# Patient Record
Sex: FEMALE | Race: WHITE | NOT HISPANIC OR LATINO | Employment: FULL TIME | URBAN - NONMETROPOLITAN AREA
[De-identification: names, ages, dates, MRNs, and addresses within clinical notes are randomized per-mention and may not be internally consistent; named-entity substitution may affect disease eponyms.]

---

## 2023-12-27 RX ORDER — LEVETIRACETAM 750 MG/1
TABLET ORAL
COMMUNITY

## 2023-12-27 RX ORDER — SULFASALAZINE 500 MG/1
500 TABLET, DELAYED RELEASE ORAL 2 TIMES DAILY
COMMUNITY

## 2023-12-27 RX ORDER — BISMUTH SUBSALICYLATE 262 MG
1 TABLET,CHEWABLE ORAL DAILY
COMMUNITY

## 2023-12-27 RX ORDER — FOLIC ACID 1 MG/1
1 TABLET ORAL DAILY
COMMUNITY

## 2023-12-27 RX ORDER — BIOTIN 10 MG
TABLET ORAL
COMMUNITY

## 2023-12-27 RX ORDER — VERAPAMIL HYDROCHLORIDE 180 MG/1
180 CAPSULE, EXTENDED RELEASE ORAL NIGHTLY
COMMUNITY
End: 2024-01-10 | Stop reason: SDUPTHER

## 2023-12-27 RX ORDER — ERGOCALCIFEROL 1.25 MG/1
1.25 CAPSULE ORAL
COMMUNITY

## 2023-12-27 RX ORDER — FLECAINIDE ACETATE 50 MG/1
50 TABLET ORAL 2 TIMES DAILY
COMMUNITY
End: 2024-01-17 | Stop reason: SDUPTHER

## 2023-12-27 RX ORDER — PAROXETINE HYDROCHLORIDE 40 MG/1
40 TABLET, FILM COATED ORAL EVERY MORNING
COMMUNITY

## 2023-12-27 RX ORDER — ARIPIPRAZOLE 2 MG/1
2 TABLET ORAL DAILY
COMMUNITY

## 2024-01-10 ENCOUNTER — OFFICE VISIT (OUTPATIENT)
Dept: CARDIOLOGY | Facility: CLINIC | Age: 64
End: 2024-01-10
Payer: COMMERCIAL

## 2024-01-10 VITALS
HEIGHT: 65 IN | WEIGHT: 188 LBS | HEART RATE: 64 BPM | DIASTOLIC BLOOD PRESSURE: 56 MMHG | SYSTOLIC BLOOD PRESSURE: 96 MMHG | BODY MASS INDEX: 31.32 KG/M2

## 2024-01-10 DIAGNOSIS — E66.9 OBESITY (BMI 30.0-34.9): ICD-10-CM

## 2024-01-10 DIAGNOSIS — I10 ESSENTIAL HYPERTENSION: ICD-10-CM

## 2024-01-10 DIAGNOSIS — Z79.899 HIGH RISK MEDICATION USE: ICD-10-CM

## 2024-01-10 DIAGNOSIS — Z01.810 PREOP CARDIOVASCULAR EXAM: ICD-10-CM

## 2024-01-10 DIAGNOSIS — G47.33 OBSTRUCTIVE SLEEP APNEA SYNDROME: ICD-10-CM

## 2024-01-10 DIAGNOSIS — I48.0 PAROXYSMAL ATRIAL FIBRILLATION (MULTI): ICD-10-CM

## 2024-01-10 PROBLEM — R00.0 SINUS TACHYCARDIA: Status: ACTIVE | Noted: 2024-01-10

## 2024-01-10 PROBLEM — E66.811 OBESITY (BMI 30.0-34.9): Status: ACTIVE | Noted: 2024-01-10

## 2024-01-10 PROCEDURE — 1036F TOBACCO NON-USER: CPT | Performed by: INTERNAL MEDICINE

## 2024-01-10 PROCEDURE — 3074F SYST BP LT 130 MM HG: CPT | Performed by: INTERNAL MEDICINE

## 2024-01-10 PROCEDURE — 93000 ELECTROCARDIOGRAM COMPLETE: CPT | Performed by: INTERNAL MEDICINE

## 2024-01-10 PROCEDURE — 99214 OFFICE O/P EST MOD 30 MIN: CPT | Performed by: INTERNAL MEDICINE

## 2024-01-10 PROCEDURE — 1159F MED LIST DOCD IN RCRD: CPT | Performed by: INTERNAL MEDICINE

## 2024-01-10 PROCEDURE — 3078F DIAST BP <80 MM HG: CPT | Performed by: INTERNAL MEDICINE

## 2024-01-10 RX ORDER — VERAPAMIL HYDROCHLORIDE 180 MG/1
180 CAPSULE, EXTENDED RELEASE ORAL NIGHTLY
Qty: 90 CAPSULE | Refills: 3 | Status: SHIPPED | OUTPATIENT
Start: 2024-01-10

## 2024-01-10 NOTE — PATIENT INSTRUCTIONS
Please bring all medicines, vitamins, and herbal supplements with you when you come to the office.    Prescriptions will not be filled unless you are compliant with your follow up appointments or have a follow up appointment scheduled as per instruction of your physician. Refills should be requested at the time of your visit.     Cleared for total knee surgery, Hold eliquis 2 days prior

## 2024-01-10 NOTE — LETTER
January 10, 2024     Adrianna Patel MD  [address] [facility] [address]    Patient: Lucina Gordon   YOB: 1960   Date of Visit: 1/10/2024       Dear Dr. Adrianna Patel MD:    Thank you for referring Lucina Gordon to me for evaluation. Below are my notes for this consultation.  If you have questions, please do not hesitate to call me. I look forward to following your patient along with you.       Sincerely,     Lis Zaman MD      CC: Duke Castellano, DO  ______________________________________________________________________________________    Subjective   Lucina Gordon is a 63 y.o. female       Chief Complaint    Pre-op Clearance          HPI   Patient is in the office for follow-up for the problems noted below.  Since her last visit she lost significant weight on purpose and was diagnosed with sleep apnea and has been utilizing CPAP machine with excellent results.  She remains in normal sinus rhythm confirmed by EKG today with normal intervals.  She has no breakthrough events and no bleeding complications.  She is scheduled to have surgery on the left knee in the near future and was advised to proceed with surgery as planned and stop the Eliquis 2 to 3 days prior to surgery.  No cardiac testing will be needed in preparation for the surgery and the cardiac risks are low risk.    Assessment/recommendations:     1-paroxysmal atrial fibrillation, patient is currently on flecainide 50 mg 2 times daily along with the Eliquis. She had normal cardiac catheterization 2009 and normal echocardiogram December 2021. EKG reveals normal sinus rhythm and normal intervals  2- sleep apnea confirmed by sleep study recently currently on CPAP machine compliant with the device.  It has led to significant improvement in quality of life and weight loss.    3-high risk medication with antiarrhythmics anticoagulants. Well-tolerated  4-class I obesity, the patient was successful in losing  "significant amount of weight on purpose which is very healthy.  5-patient need cardiac clearance prior to left knee replacement surgery.  Her cardiac risks are low, no cardiac vesication will be needed and the patient was advised to hold Eliquis 2 to 3 days prior to surgery and resume after surgery when the risk of bleeding subsides.     Review of Systems   All other systems reviewed and are negative.         Visit Vitals  BP 96/56 (BP Location: Right arm, Patient Position: Sitting)   Pulse 64   Ht 1.651 m (5' 5\")   Wt 85.3 kg (188 lb)   BMI 31.28 kg/m²   Smoking Status Former   BSA 1.98 m²      EKG done in office today   Objective   Physical Exam  Constitutional:       Appearance: Normal appearance. She is normal weight.   HENT:      Nose: Nose normal.   Neck:      Vascular: No carotid bruit.   Cardiovascular:      Rate and Rhythm: Normal rate.      Pulses: Normal pulses.      Heart sounds: Normal heart sounds.   Pulmonary:      Effort: Pulmonary effort is normal.   Abdominal:      General: Bowel sounds are normal.      Palpations: Abdomen is soft.   Genitourinary:     Rectum: Normal.   Musculoskeletal:         General: Normal range of motion.      Cervical back: Normal range of motion.      Right lower leg: No edema.      Left lower leg: No edema.   Skin:     General: Skin is warm and dry.   Neurological:      General: No focal deficit present.      Mental Status: She is alert.   Psychiatric:         Mood and Affect: Mood normal.         Behavior: Behavior normal.         Thought Content: Thought content normal.         Judgment: Judgment normal.         Current Medications    Current Outpatient Medications:   •  apixaban (Eliquis) 5 mg tablet, Take 1 tablet (5 mg) by mouth 2 times a day., Disp: , Rfl:   •  ARIPiprazole (Abilify) 2 mg tablet, Take 1 tablet (2 mg) by mouth once daily., Disp: , Rfl:   •  biotin 10 mg tablet, Take by mouth., Disp: , Rfl:   •  ergocalciferol (Vitamin D2) 1.25 MG (60439 UT) capsule, " Take 1 capsule (1,250 mcg) by mouth 1 (one) time per week., Disp: , Rfl:   •  flecainide (Tambocor) 50 mg tablet, Take 1 tablet (50 mg) by mouth 2 times a day., Disp: , Rfl:   •  folic acid (Folvite) 1 mg tablet, Take 1 tablet (1 mg) by mouth once daily., Disp: , Rfl:   •  levETIRAcetam (Keppra) 750 mg tablet, Take by mouth., Disp: , Rfl:   •  multivitamin tablet, Take 1 tablet by mouth once daily., Disp: , Rfl:   •  OXYBUTYNIN CHLORIDE ORAL, Take 5 mg by mouth 2 times a day., Disp: , Rfl:   •  PARoxetine (Paxil) 40 mg tablet, Take 1 tablet (40 mg) by mouth once daily in the morning., Disp: , Rfl:   •  sulfaSALAzine (Azulfidine) 500 mg DR tablet, Take 1 tablet (500 mg) by mouth 2 times a day. Do not crush, chew, or split., Disp: , Rfl:   •  verapamil ER (Veralan PM) 180 mg 24 hr capsule, Take 1 capsule (180 mg) by mouth once daily at bedtime. Do not crush or chew., Disp: , Rfl:            Scribe Attestation  By signing my name below, I, Katelynn NORIEGA LPN  , Scribe   attest that this documentation has been prepared under the direction and in the presence of Lis Zaman MD.           Assessment/Plan   1. Preop cardiovascular exam        2. High risk medication use        3. Paroxysmal atrial fibrillation (CMS/HCC)        4. Obstructive sleep apnea syndrome        5. Obesity (BMI 30.0-34.9)        6. Essential hypertension

## 2024-01-17 DIAGNOSIS — I48.0 PAROXYSMAL ATRIAL FIBRILLATION (MULTI): ICD-10-CM

## 2024-01-17 RX ORDER — FLECAINIDE ACETATE 50 MG/1
50 TABLET ORAL 2 TIMES DAILY
Qty: 180 TABLET | Refills: 3 | Status: SHIPPED | OUTPATIENT
Start: 2024-01-17 | End: 2025-01-16

## 2024-07-23 ENCOUNTER — APPOINTMENT (OUTPATIENT)
Dept: CARDIOLOGY | Facility: CLINIC | Age: 64
End: 2024-07-23
Payer: COMMERCIAL

## 2024-07-23 VITALS
BODY MASS INDEX: 26.66 KG/M2 | DIASTOLIC BLOOD PRESSURE: 78 MMHG | HEIGHT: 65 IN | HEART RATE: 62 BPM | WEIGHT: 160 LBS | SYSTOLIC BLOOD PRESSURE: 114 MMHG

## 2024-07-23 DIAGNOSIS — I10 ESSENTIAL HYPERTENSION: ICD-10-CM

## 2024-07-23 DIAGNOSIS — Z87.891 FORMER SMOKER: ICD-10-CM

## 2024-07-23 DIAGNOSIS — I48.0 PAROXYSMAL ATRIAL FIBRILLATION (MULTI): Primary | ICD-10-CM

## 2024-07-23 DIAGNOSIS — Z79.899 HIGH RISK MEDICATION USE: ICD-10-CM

## 2024-07-23 PROBLEM — E66.9 OBESITY (BMI 30.0-34.9): Status: RESOLVED | Noted: 2024-01-10 | Resolved: 2024-07-23

## 2024-07-23 PROBLEM — E66.811 OBESITY (BMI 30.0-34.9): Status: RESOLVED | Noted: 2024-01-10 | Resolved: 2024-07-23

## 2024-07-23 PROCEDURE — 99214 OFFICE O/P EST MOD 30 MIN: CPT | Performed by: INTERNAL MEDICINE

## 2024-07-23 PROCEDURE — 1036F TOBACCO NON-USER: CPT | Performed by: INTERNAL MEDICINE

## 2024-07-23 PROCEDURE — 93000 ELECTROCARDIOGRAM COMPLETE: CPT | Performed by: INTERNAL MEDICINE

## 2024-07-23 PROCEDURE — 3078F DIAST BP <80 MM HG: CPT | Performed by: INTERNAL MEDICINE

## 2024-07-23 PROCEDURE — 1159F MED LIST DOCD IN RCRD: CPT | Performed by: INTERNAL MEDICINE

## 2024-07-23 PROCEDURE — 3074F SYST BP LT 130 MM HG: CPT | Performed by: INTERNAL MEDICINE

## 2024-07-23 PROCEDURE — 3008F BODY MASS INDEX DOCD: CPT | Performed by: INTERNAL MEDICINE

## 2024-07-23 RX ORDER — VERAPAMIL HYDROCHLORIDE 180 MG/1
180 CAPSULE, EXTENDED RELEASE ORAL NIGHTLY
Qty: 90 CAPSULE | Refills: 3 | Status: SHIPPED | OUTPATIENT
Start: 2024-07-23 | End: 2025-07-23

## 2024-07-23 RX ORDER — FLECAINIDE ACETATE 50 MG/1
50 TABLET ORAL 2 TIMES DAILY
Qty: 180 TABLET | Refills: 3 | Status: SHIPPED | OUTPATIENT
Start: 2024-07-23 | End: 2025-07-23

## 2024-07-23 RX ORDER — LEVETIRACETAM 1000 MG/1
1000 TABLET ORAL 2 TIMES DAILY
COMMUNITY

## 2024-07-23 NOTE — LETTER
July 23, 2024     Adrianna Patel MD  [address] [facility] [address]    Patient: Lucina Gordon   YOB: 1960   Date of Visit: 7/23/2024       Dear Dr. Adrianna Patel MD:    Thank you for referring Lucina Gordon to me for evaluation. Below are my notes for this consultation.  If you have questions, please do not hesitate to call me. I look forward to following your patient along with you.       Sincerely,     Lis Zaman MD      CC: No Recipients  ______________________________________________________________________________________    Subjective   Lucina Gordon is a 63 y.o. female       Chief Complaint    Follow-up          HPI   Patient is in the office for follow-up for the problems noted below.  Since her last visit she has had no cardiac events but continues to lose weight and progress dropping her weight 28 pounds.  BMI is very close to target.  EKG today confirmed normal sinus rhythm with normal intervals.  Her blood pressure is under control.  Her examination was essentially unremarkable.  Her labs have been followed closely with no concern noted.    Assessment/recommendations:     1-paroxysmal atrial fibrillation, patient is currently on flecainide 50 mg 2 times daily along with the Eliquis. She had normal cardiac catheterization 2009 and normal echocardiogram December 2021. EKG reveals normal sinus rhythm and normal intervals  2- sleep apnea confirmed by sleep study recently currently on CPAP machine compliant with the device.  It has led to significant improvement in quality of life and weight loss.    3-high risk medication with antiarrhythmics anticoagulants. Well-tolerated  4-mild overweight, the patient lost 28 pounds since her last visit and she is approaching ideal body weight.    Review of Systems   All other systems reviewed and are negative.           Vitals:    07/23/24 1102   BP: 114/78   BP Location: Right arm   Patient Position: Sitting   Pulse: 62  "  Weight: 72.6 kg (160 lb)   Height: 1.651 m (5' 5\")     EKG done in office today     Objective   Physical Exam  Constitutional:       Appearance: Normal appearance.   HENT:      Nose: Nose normal.   Neck:      Vascular: No carotid bruit.   Cardiovascular:      Rate and Rhythm: Normal rate.      Pulses: Normal pulses.      Heart sounds: Normal heart sounds.   Pulmonary:      Effort: Pulmonary effort is normal.   Abdominal:      General: Bowel sounds are normal.      Palpations: Abdomen is soft.   Musculoskeletal:         General: Normal range of motion.      Cervical back: Normal range of motion.      Right lower leg: No edema.      Left lower leg: No edema.   Skin:     General: Skin is warm and dry.   Neurological:      General: No focal deficit present.      Mental Status: She is alert.   Psychiatric:         Mood and Affect: Mood normal.         Behavior: Behavior normal.         Thought Content: Thought content normal.         Judgment: Judgment normal.         Allergies  Patient has no known allergies.     Current Medications    Current Outpatient Medications:   •  apixaban (Eliquis) 5 mg tablet, Take 1 tablet (5 mg) by mouth 2 times a day., Disp: , Rfl:   •  ARIPiprazole (Abilify) 2 mg tablet, Take 1 tablet (2 mg) by mouth once daily., Disp: , Rfl:   •  flecainide (Tambocor) 50 mg tablet, Take 1 tablet (50 mg) by mouth 2 times a day., Disp: 180 tablet, Rfl: 3  •  folic acid (Folvite) 1 mg tablet, Take 1 tablet (1 mg) by mouth once daily., Disp: , Rfl:   •  levETIRAcetam (Keppra) 1,000 mg tablet, Take 1 tablet (1,000 mg) by mouth 2 times a day., Disp: , Rfl:   •  multivitamin tablet, Take 1 tablet by mouth once daily., Disp: , Rfl:   •  OXYBUTYNIN CHLORIDE ORAL, Take 5 mg by mouth 2 times a day., Disp: , Rfl:   •  PARoxetine (Paxil) 40 mg tablet, Take 1 tablet (40 mg) by mouth once daily in the morning., Disp: , Rfl:   •  sulfaSALAzine (Azulfidine) 500 mg DR tablet, Take 1 tablet (500 mg) by mouth 2 times a " day. Do not crush, chew, or split., Disp: , Rfl:   •  verapamil ER (Veralan PM) 180 mg 24 hr capsule, Take 1 capsule (180 mg) by mouth once daily at bedtime. Do not crush or chew., Disp: 90 capsule, Rfl: 3  •  biotin 10 mg tablet, Take by mouth., Disp: , Rfl:   •  ergocalciferol (Vitamin D2) 1.25 MG (99875 UT) capsule, Take 1 capsule (1,250 mcg) by mouth 1 (one) time per week., Disp: , Rfl:                      Assessment/Plan   1. Paroxysmal atrial fibrillation (Multi)  Follow Up In Cardiology      2. BMI 26.0-26.9,adult        3. Essential hypertension        4. Sinus tachycardia        5. High risk medication use        6. Former smoker                 Scribe Attestation  By signing my name below, IDidi LPN  , Scribe   attest that this documentation has been prepared under the direction and in the presence of Lis Zaman MD.     Provider Attestation - Scribe documentation    All medical record entries made by the Scribe were at my direction and personally dictated by me. I have reviewed the chart and agree that the record accurately reflects my personal performance of the history, physical exam, discussion and plan.

## 2024-07-23 NOTE — PROGRESS NOTES
"Subjective   Lucina Gordon is a 63 y.o. female       Chief Complaint    Follow-up          HPI   Patient is in the office for follow-up for the problems noted below.  Since her last visit she has had no cardiac events but continues to lose weight and progress dropping her weight 28 pounds.  BMI is very close to target.  EKG today confirmed normal sinus rhythm with normal intervals.  Her blood pressure is under control.  Her examination was essentially unremarkable.  Her labs have been followed closely with no concern noted.    Assessment/recommendations:     1-paroxysmal atrial fibrillation, patient is currently on flecainide 50 mg 2 times daily along with the Eliquis. She had normal cardiac catheterization 2009 and normal echocardiogram December 2021. EKG reveals normal sinus rhythm and normal intervals  2- sleep apnea confirmed by sleep study recently currently on CPAP machine compliant with the device.  It has led to significant improvement in quality of life and weight loss.    3-high risk medication with antiarrhythmics anticoagulants. Well-tolerated  4-mild overweight, the patient lost 28 pounds since her last visit and she is approaching ideal body weight.    Review of Systems   All other systems reviewed and are negative.           Vitals:    07/23/24 1102   BP: 114/78   BP Location: Right arm   Patient Position: Sitting   Pulse: 62   Weight: 72.6 kg (160 lb)   Height: 1.651 m (5' 5\")     EKG done in office today     Objective   Physical Exam  Constitutional:       Appearance: Normal appearance.   HENT:      Nose: Nose normal.   Neck:      Vascular: No carotid bruit.   Cardiovascular:      Rate and Rhythm: Normal rate.      Pulses: Normal pulses.      Heart sounds: Normal heart sounds.   Pulmonary:      Effort: Pulmonary effort is normal.   Abdominal:      General: Bowel sounds are normal.      Palpations: Abdomen is soft.   Musculoskeletal:         General: Normal range of motion.      Cervical back: " Normal range of motion.      Right lower leg: No edema.      Left lower leg: No edema.   Skin:     General: Skin is warm and dry.   Neurological:      General: No focal deficit present.      Mental Status: She is alert.   Psychiatric:         Mood and Affect: Mood normal.         Behavior: Behavior normal.         Thought Content: Thought content normal.         Judgment: Judgment normal.         Allergies  Patient has no known allergies.     Current Medications    Current Outpatient Medications:     apixaban (Eliquis) 5 mg tablet, Take 1 tablet (5 mg) by mouth 2 times a day., Disp: , Rfl:     ARIPiprazole (Abilify) 2 mg tablet, Take 1 tablet (2 mg) by mouth once daily., Disp: , Rfl:     flecainide (Tambocor) 50 mg tablet, Take 1 tablet (50 mg) by mouth 2 times a day., Disp: 180 tablet, Rfl: 3    folic acid (Folvite) 1 mg tablet, Take 1 tablet (1 mg) by mouth once daily., Disp: , Rfl:     levETIRAcetam (Keppra) 1,000 mg tablet, Take 1 tablet (1,000 mg) by mouth 2 times a day., Disp: , Rfl:     multivitamin tablet, Take 1 tablet by mouth once daily., Disp: , Rfl:     OXYBUTYNIN CHLORIDE ORAL, Take 5 mg by mouth 2 times a day., Disp: , Rfl:     PARoxetine (Paxil) 40 mg tablet, Take 1 tablet (40 mg) by mouth once daily in the morning., Disp: , Rfl:     sulfaSALAzine (Azulfidine) 500 mg DR tablet, Take 1 tablet (500 mg) by mouth 2 times a day. Do not crush, chew, or split., Disp: , Rfl:     verapamil ER (Veralan PM) 180 mg 24 hr capsule, Take 1 capsule (180 mg) by mouth once daily at bedtime. Do not crush or chew., Disp: 90 capsule, Rfl: 3    biotin 10 mg tablet, Take by mouth., Disp: , Rfl:     ergocalciferol (Vitamin D2) 1.25 MG (89647 UT) capsule, Take 1 capsule (1,250 mcg) by mouth 1 (one) time per week., Disp: , Rfl:                      Assessment/Plan   1. Paroxysmal atrial fibrillation (Multi)  Follow Up In Cardiology      2. BMI 26.0-26.9,adult        3. Essential hypertension        4. Sinus tachycardia         5. High risk medication use        6. Former smoker                 Scribe Attestation  By signing my name below, I, Didi ARAIZA LPN  , Scribe   attest that this documentation has been prepared under the direction and in the presence of Lis Zaman MD.     Provider Attestation - Scribe documentation    All medical record entries made by the Scribe were at my direction and personally dictated by me. I have reviewed the chart and agree that the record accurately reflects my personal performance of the history, physical exam, discussion and plan.

## 2024-07-23 NOTE — PATIENT INSTRUCTIONS
Please bring all medicines, vitamins, and herbal supplements with you when you come to the office.    Prescriptions will not be filled unless you are compliant with your follow up appointments or have a follow up appointment scheduled as per instruction of your physician. Refills should be requested at the time of your visit.   BMI was above normal measurement. Current weight: 72.6 kg (160 lb)  Weight change since last visit (-) denotes wt loss -28 lbs   Weight loss needed to achieve BMI 25: 10.1 Lbs  Weight loss needed to achieve BMI 30: -19.9 Lbs  Advised to Increase physical activity.

## 2025-02-25 ENCOUNTER — APPOINTMENT (OUTPATIENT)
Dept: CARDIOLOGY | Facility: CLINIC | Age: 65
End: 2025-02-25
Payer: COMMERCIAL

## 2025-02-25 VITALS
WEIGHT: 143 LBS | DIASTOLIC BLOOD PRESSURE: 70 MMHG | HEIGHT: 65 IN | BODY MASS INDEX: 23.82 KG/M2 | SYSTOLIC BLOOD PRESSURE: 118 MMHG | HEART RATE: 70 BPM

## 2025-02-25 DIAGNOSIS — I48.0 PAROXYSMAL ATRIAL FIBRILLATION (MULTI): ICD-10-CM

## 2025-02-25 DIAGNOSIS — Z87.891 FORMER SMOKER: ICD-10-CM

## 2025-02-25 DIAGNOSIS — D64.9 ANEMIA, UNSPECIFIED TYPE: ICD-10-CM

## 2025-02-25 DIAGNOSIS — Z79.899 HIGH RISK MEDICATION USE: ICD-10-CM

## 2025-02-25 DIAGNOSIS — I10 ESSENTIAL HYPERTENSION: ICD-10-CM

## 2025-02-25 DIAGNOSIS — G47.33 OBSTRUCTIVE SLEEP APNEA SYNDROME: ICD-10-CM

## 2025-02-25 PROBLEM — K51.90 ULCERATIVE COLITIS: Status: ACTIVE | Noted: 2025-02-25

## 2025-02-25 PROCEDURE — 3008F BODY MASS INDEX DOCD: CPT | Performed by: INTERNAL MEDICINE

## 2025-02-25 PROCEDURE — 99214 OFFICE O/P EST MOD 30 MIN: CPT | Performed by: INTERNAL MEDICINE

## 2025-02-25 PROCEDURE — 93000 ELECTROCARDIOGRAM COMPLETE: CPT | Performed by: INTERNAL MEDICINE

## 2025-02-25 PROCEDURE — 1036F TOBACCO NON-USER: CPT | Performed by: INTERNAL MEDICINE

## 2025-02-25 PROCEDURE — 3078F DIAST BP <80 MM HG: CPT | Performed by: INTERNAL MEDICINE

## 2025-02-25 PROCEDURE — 3074F SYST BP LT 130 MM HG: CPT | Performed by: INTERNAL MEDICINE

## 2025-02-25 PROCEDURE — 1159F MED LIST DOCD IN RCRD: CPT | Performed by: INTERNAL MEDICINE

## 2025-02-25 RX ORDER — MULTIVIT-MIN/IRON FUM/FOLIC AC 7.5 MG-4
1 TABLET ORAL DAILY
COMMUNITY

## 2025-02-25 RX ORDER — MULTIVITAMIN
1 TABLET ORAL 2 TIMES DAILY
COMMUNITY
Start: 2025-01-07

## 2025-02-25 RX ORDER — CYANOCOBALAMIN 1000 UG/ML
100 INJECTION, SOLUTION INTRAMUSCULAR; SUBCUTANEOUS
COMMUNITY
Start: 2023-09-12

## 2025-02-25 RX ORDER — OXYBUTYNIN CHLORIDE 5 MG/1
1 TABLET ORAL
COMMUNITY
Start: 2025-02-05

## 2025-02-25 ASSESSMENT — ENCOUNTER SYMPTOMS: LIGHT-HEADEDNESS: 1

## 2025-02-25 NOTE — PROGRESS NOTES
"Subjective   Lucina Gordon is a 64 y.o. female       Chief Complaint    Follow-up          HPI   Patient is in the office for follow-up for the problems noted below.  Since her last visit she has had no breakthrough atrial fibrillation and continue to follow her PCP.  Lab data since her last visit revealed anemia hemoglobin just under 10 g/dL.  She had previous history of ulcerative colitis which have been in remission last colonoscopy several months ago revealed no pathology of concern.  She is not known to have peptic ulcer disease, denies any hematuria or any obvious blood loss.  EKG confirmed normal sinus rhythm, patient has lost significant weight since her last visit and her BMI is now ideal and was encouraged to keep it as such.    Assessment/recommendations:     1-paroxysmal atrial fibrillation, patient is currently on flecainide 50 mg 2 times daily along with the Eliquis. She had normal cardiac catheterization 2009 and normal echocardiogram December 2021. EKG reveals normal sinus rhythm and normal intervals with left anterior fascicular block  2-obstructive sleep apnea confirmed by sleep study recently currently on CPAP machine compliant with the device.  It has led to significant improvement in quality of life and weight loss.    3-high risk medication with antiarrhythmics anticoagulants. Well-tolerated  4-mild anemia, discussed with the patient the need to have this looked into through her PCP.  Presently no obvious clinical blood loss.  Bone marrow disorder has to be contemplated  Review of Systems   Neurological:  Positive for light-headedness.   All other systems reviewed and are negative.           Vitals:    02/25/25 1011   BP: 118/70   BP Location: Right arm   Patient Position: Sitting   Pulse: 70   Weight: 64.9 kg (143 lb)   Height: 1.651 m (5' 5\")      EKG done in office today    Objective   Physical Exam  Constitutional:       Appearance: Normal appearance.   HENT:      Nose: Nose normal. "   Neck:      Vascular: No carotid bruit.   Cardiovascular:      Rate and Rhythm: Normal rate.      Pulses: Normal pulses.      Heart sounds: Normal heart sounds.   Pulmonary:      Effort: Pulmonary effort is normal.   Abdominal:      General: Bowel sounds are normal.      Palpations: Abdomen is soft.   Musculoskeletal:         General: Normal range of motion.      Cervical back: Normal range of motion.      Right lower leg: No edema.      Left lower leg: No edema.   Skin:     General: Skin is warm and dry.   Neurological:      General: No focal deficit present.      Mental Status: She is alert.   Psychiatric:         Mood and Affect: Mood normal.         Behavior: Behavior normal.         Thought Content: Thought content normal.         Judgment: Judgment normal.         Allergies  Patient has no known allergies.     Current Medications    Current Outpatient Medications:     apixaban (Eliquis) 5 mg tablet, Take 1 tablet (5 mg) by mouth 2 times a day., Disp: 180 tablet, Rfl: 3    ARIPiprazole (Abilify) 2 mg tablet, Take 1 tablet (2 mg) by mouth once daily., Disp: , Rfl:     calcium carbonate-vitamin D3 600 mg-10 mcg (400 unit) tablet, Take 1 tablet by mouth 2 times a day., Disp: , Rfl:     cyanocobalamin (Vitamin B-12) 1,000 mcg/mL injection, Inject 0.1 mL (100 mcg) into the muscle every 30 (thirty) days., Disp: , Rfl:     flecainide (Tambocor) 50 mg tablet, Take 1 tablet (50 mg) by mouth 2 times a day., Disp: 180 tablet, Rfl: 3    folic acid (Folvite) 1 mg tablet, Take 1 tablet (1 mg) by mouth once daily., Disp: , Rfl:     levETIRAcetam (Keppra) 1,000 mg tablet, Take 1 tablet (1,000 mg) by mouth 2 times a day., Disp: , Rfl:     multivitamin tablet, Take 1 tablet by mouth once daily., Disp: , Rfl:     multivitamin with minerals tablet, Take 1 tablet by mouth once daily., Disp: , Rfl:     oxybutynin (Ditropan) 5 mg tablet, Take 1 tablet (5 mg) by mouth every 12 hours., Disp: , Rfl:     PARoxetine (Paxil) 40 mg tablet,  Take 1 tablet (40 mg) by mouth once daily in the morning., Disp: , Rfl:     sulfaSALAzine (Azulfidine) 500 mg DR tablet, Take 1 tablet (500 mg) by mouth 2 times a day. Do not crush, chew, or split., Disp: , Rfl:     verapamil ER (Veralan PM) 180 mg 24 hr capsule, Take 1 capsule (180 mg) by mouth once daily at bedtime. Do not crush or chew., Disp: 90 capsule, Rfl: 3                     Assessment/Plan   1. Paroxysmal atrial fibrillation (Multi)  Follow Up In Cardiology    ECG 12 Lead      2. High risk medication use        3. Essential hypertension  Follow Up In Cardiology      4. Obstructive sleep apnea syndrome        5. Former smoker        6. Anemia, unspecified type        7. BMI 23.0-23.9, adult                 Scribe Attestation  By signing my name below, Khalida HARRELL LPN, Scribe   attest that this documentation has been prepared under the direction and in the presence of Lis Zaman MD.     Provider Attestation - Scribe documentation    All medical record entries made by the Scribe were at my direction and personally dictated by me. I have reviewed the chart and agree that the record accurately reflects my personal performance of the history, physical exam, discussion and plan.

## 2025-02-25 NOTE — LETTER
February 25, 2025     Adrianna Patel MD  [address] [facility] [address]    Patient: Lucina Gordon   YOB: 1960   Date of Visit: 2/25/2025       Dear Dr. Adrianna Patel MD:    Thank you for referring Lucina Gordon to me for evaluation. Below are my notes for this consultation.  If you have questions, please do not hesitate to call me. I look forward to following your patient along with you.       Sincerely,     Lis Zaman MD      CC: No Recipients  ______________________________________________________________________________________    Subjective   Lucina Gordon is a 64 y.o. female       Chief Complaint    Follow-up          HPI   Patient is in the office for follow-up for the problems noted below.  Since her last visit she has had no breakthrough atrial fibrillation and continue to follow her PCP.  Lab data since her last visit revealed anemia hemoglobin just under 10 g/dL.  She had previous history of ulcerative colitis which have been in remission last colonoscopy several months ago revealed no pathology of concern.  She is not known to have peptic ulcer disease, denies any hematuria or any obvious blood loss.  EKG confirmed normal sinus rhythm, patient has lost significant weight since her last visit and her BMI is now ideal and was encouraged to keep it as such.    Assessment/recommendations:     1-paroxysmal atrial fibrillation, patient is currently on flecainide 50 mg 2 times daily along with the Eliquis. She had normal cardiac catheterization 2009 and normal echocardiogram December 2021. EKG reveals normal sinus rhythm and normal intervals with left anterior fascicular block  2-obstructive sleep apnea confirmed by sleep study recently currently on CPAP machine compliant with the device.  It has led to significant improvement in quality of life and weight loss.    3-high risk medication with antiarrhythmics anticoagulants. Well-tolerated  4-mild anemia, discussed with  "the patient the need to have this looked into through her PCP.  Presently no obvious clinical blood loss.  Bone marrow disorder has to be contemplated  Review of Systems   Neurological:  Positive for light-headedness.   All other systems reviewed and are negative.           Vitals:    02/25/25 1011   BP: 118/70   BP Location: Right arm   Patient Position: Sitting   Pulse: 70   Weight: 64.9 kg (143 lb)   Height: 1.651 m (5' 5\")      EKG done in office today    Objective   Physical Exam  Constitutional:       Appearance: Normal appearance.   HENT:      Nose: Nose normal.   Neck:      Vascular: No carotid bruit.   Cardiovascular:      Rate and Rhythm: Normal rate.      Pulses: Normal pulses.      Heart sounds: Normal heart sounds.   Pulmonary:      Effort: Pulmonary effort is normal.   Abdominal:      General: Bowel sounds are normal.      Palpations: Abdomen is soft.   Musculoskeletal:         General: Normal range of motion.      Cervical back: Normal range of motion.      Right lower leg: No edema.      Left lower leg: No edema.   Skin:     General: Skin is warm and dry.   Neurological:      General: No focal deficit present.      Mental Status: She is alert.   Psychiatric:         Mood and Affect: Mood normal.         Behavior: Behavior normal.         Thought Content: Thought content normal.         Judgment: Judgment normal.         Allergies  Patient has no known allergies.     Current Medications    Current Outpatient Medications:   •  apixaban (Eliquis) 5 mg tablet, Take 1 tablet (5 mg) by mouth 2 times a day., Disp: 180 tablet, Rfl: 3  •  ARIPiprazole (Abilify) 2 mg tablet, Take 1 tablet (2 mg) by mouth once daily., Disp: , Rfl:   •  calcium carbonate-vitamin D3 600 mg-10 mcg (400 unit) tablet, Take 1 tablet by mouth 2 times a day., Disp: , Rfl:   •  cyanocobalamin (Vitamin B-12) 1,000 mcg/mL injection, Inject 0.1 mL (100 mcg) into the muscle every 30 (thirty) days., Disp: , Rfl:   •  flecainide (Tambocor) 50 " mg tablet, Take 1 tablet (50 mg) by mouth 2 times a day., Disp: 180 tablet, Rfl: 3  •  folic acid (Folvite) 1 mg tablet, Take 1 tablet (1 mg) by mouth once daily., Disp: , Rfl:   •  levETIRAcetam (Keppra) 1,000 mg tablet, Take 1 tablet (1,000 mg) by mouth 2 times a day., Disp: , Rfl:   •  multivitamin tablet, Take 1 tablet by mouth once daily., Disp: , Rfl:   •  multivitamin with minerals tablet, Take 1 tablet by mouth once daily., Disp: , Rfl:   •  oxybutynin (Ditropan) 5 mg tablet, Take 1 tablet (5 mg) by mouth every 12 hours., Disp: , Rfl:   •  PARoxetine (Paxil) 40 mg tablet, Take 1 tablet (40 mg) by mouth once daily in the morning., Disp: , Rfl:   •  sulfaSALAzine (Azulfidine) 500 mg DR tablet, Take 1 tablet (500 mg) by mouth 2 times a day. Do not crush, chew, or split., Disp: , Rfl:   •  verapamil ER (Veralan PM) 180 mg 24 hr capsule, Take 1 capsule (180 mg) by mouth once daily at bedtime. Do not crush or chew., Disp: 90 capsule, Rfl: 3                     Assessment/Plan   1. Paroxysmal atrial fibrillation (Multi)  Follow Up In Cardiology    ECG 12 Lead      2. High risk medication use        3. Essential hypertension  Follow Up In Cardiology      4. Obstructive sleep apnea syndrome        5. Former smoker        6. Anemia, unspecified type        7. BMI 23.0-23.9, adult                 Scribe Attestation  By signing my name below, Khalida HARRELL LPN, Scribe   attest that this documentation has been prepared under the direction and in the presence of Lis Zaman MD.     Provider Attestation - Scribe documentation    All medical record entries made by the Scribe were at my direction and personally dictated by me. I have reviewed the chart and agree that the record accurately reflects my personal performance of the history, physical exam, discussion and plan.

## 2025-02-25 NOTE — PATIENT INSTRUCTIONS
Please bring all medicines, vitamins, and herbal supplements with you when you come to the office.    Prescriptions will not be filled unless you are compliant with your follow up appointments or have a follow up appointment scheduled as per instruction of your physician. Refills should be requested at the time of your visit.     Fall Prevention Education Given     6 months

## 2025-07-31 DIAGNOSIS — I48.0 PAROXYSMAL ATRIAL FIBRILLATION (MULTI): ICD-10-CM

## 2025-08-01 RX ORDER — FLECAINIDE ACETATE 50 MG/1
50 TABLET ORAL 2 TIMES DAILY
Qty: 180 TABLET | Refills: 3 | Status: SHIPPED | OUTPATIENT
Start: 2025-08-01 | End: 2026-08-01

## 2025-08-14 ENCOUNTER — TELEPHONE (OUTPATIENT)
Dept: CARDIOLOGY | Facility: CLINIC | Age: 65
End: 2025-08-14
Payer: COMMERCIAL

## 2025-08-14 DIAGNOSIS — I10 ESSENTIAL HYPERTENSION: ICD-10-CM

## 2025-08-15 RX ORDER — VERAPAMIL HYDROCHLORIDE 180 MG/1
180 CAPSULE, DELAYED RELEASE ORAL NIGHTLY
Qty: 90 CAPSULE | Refills: 3 | Status: SHIPPED | OUTPATIENT
Start: 2025-08-15 | End: 2026-08-15

## 2025-10-14 ENCOUNTER — APPOINTMENT (OUTPATIENT)
Dept: CARDIOLOGY | Facility: CLINIC | Age: 65
End: 2025-10-14
Payer: COMMERCIAL

## 2025-10-20 ENCOUNTER — APPOINTMENT (OUTPATIENT)
Dept: CARDIOLOGY | Facility: CLINIC | Age: 65
End: 2025-10-20
Payer: COMMERCIAL